# Patient Record
Sex: MALE | Race: WHITE | Employment: STUDENT | URBAN - METROPOLITAN AREA
[De-identification: names, ages, dates, MRNs, and addresses within clinical notes are randomized per-mention and may not be internally consistent; named-entity substitution may affect disease eponyms.]

---

## 2017-06-30 ENCOUNTER — OFFICE VISIT (OUTPATIENT)
Dept: INTERNAL MEDICINE CLINIC | Age: 27
End: 2017-06-30

## 2017-06-30 VITALS
RESPIRATION RATE: 16 BRPM | WEIGHT: 187.7 LBS | BODY MASS INDEX: 26.87 KG/M2 | SYSTOLIC BLOOD PRESSURE: 126 MMHG | OXYGEN SATURATION: 99 % | HEART RATE: 78 BPM | TEMPERATURE: 97.4 F | HEIGHT: 70 IN | DIASTOLIC BLOOD PRESSURE: 69 MMHG

## 2017-06-30 DIAGNOSIS — Z00.00 WELL ADULT EXAM: Primary | ICD-10-CM

## 2017-06-30 NOTE — MR AVS SNAPSHOT
Anesthesia Evaluation     .             ROS/MED HX    ENT/Pulmonary:  - neg pulmonary ROS    (-) sleep apnea   Neurologic:  - neg neurologic ROS     Cardiovascular:  - neg cardiovascular ROS       METS/Exercise Tolerance:     Hematologic:  - neg hematologic  ROS       Musculoskeletal:   (+) arthritis, , , other musculoskeletal- failed right hip replacement      GI/Hepatic:  - neg GI/hepatic ROS      (-) GERD   Renal/Genitourinary:  - ROS Renal section negative       Endo:  - neg endo ROS       Psychiatric:  - neg psychiatric ROS       Infectious Disease:  - neg infectious disease ROS       Malignancy:      - no malignancy   Other:                     Physical Exam  Normal systems: cardiovascular, pulmonary and dental    Airway   Mallampati: II  TM distance: >3 FB  Neck ROM: full    Dental     Cardiovascular       Pulmonary                     Anesthesia Plan      History & Physical Review  History and physical reviewed and following examination; no interval change.    ASA Status:  2 .    NPO Status:  > 8 hours    Plan for General and ETT with Intravenous induction. Maintenance will be Balanced.    PONV prophylaxis:  Ondansetron (or other 5HT-3) and Dexamethasone or Solumedrol       Postoperative Care  Postoperative pain management:  IV analgesics.      Consents  Anesthetic plan, risks, benefits and alternatives discussed with:  Patient.  Use of blood products discussed: Yes.   Use of blood products discussed with Patient.  Consented to blood products.  .          Procedure: Procedure(s):  ARTHROPLASTY REVISION HIP  Preop diagnosis: FAILED RIGHT TOTAL HIP     No Known Allergies  Past Medical History:   Diagnosis Date     Allergic rhinitis      Arthritis      Gout      Squamous cell cancer of external ear      Past Surgical History:   Procedure Laterality Date     ARTHROPLASTY HIP ANTERIOR Left 3/24/2016    Procedure: ARTHROPLASTY HIP ANTERIOR;  Surgeon: Selvin Witt MD;  Location: [facility] OR     ARTHROSCOPY KNEE   Visit Information Date & Time Provider Department Dept. Phone Encounter #  
 6/30/2017  9:00 AM Raman Gomez, [address] [facility] Internal Medicine [phone] [id number] Follow-up Instructions Return in about 1 year (around 6/30/2018). Upcoming Health Maintenance Date Due DTaP/Tdap/Td series (1 - Tdap) 10/10/2012 INFLUENZA AGE 9 TO ADULT 8/1/2017 Allergies as of 6/30/2017  Review Complete On: 6/30/2017 By: Raman Gomez, DO No Known Allergies Current Immunizations  Never Reviewed No immunizations on file. Not reviewed this visit You Were Diagnosed With   
  
 Codes Comments Well adult exam    -  Primary ICD-10-CM: Z00.00 ICD-9-CM: V70.0 Vitals BP Pulse Temp Resp Height(growth percentile) Weight(growth percentile) 126/69 (BP 1 Location: Left arm, BP Patient Position: Sitting) 78 97.4 °F (36.3 °C) 16 5' 10\" (1.778 m) 187 lb 11.2 oz (85.1 kg) SpO2 BMI Smoking Status 99% 26.93 kg/m2 Never Smoker BMI and BSA Data Body Mass Index Body Surface Area  
 26.93 kg/m 2 2.05 m 2 Preferred Pharmacy Pharmacy Name Phone [address], [facility] [address] [phone] Your Updated Medication List  
  
Notice  As of 6/30/2017  9:37 AM  
 You have not been prescribed any medications. We Performed the Following METABOLIC PANEL, BASIC [89667 CPT(R)] MICROALBUMIN, UR, RAND W/ MICROALBUMIN/CREA RATIO Q027972 CPT(R)] Follow-up Instructions Return in about 1 year (around 6/30/2018). Introducing [facility]! Anastacia James introduces [employer] patient portal. Now you can access parts of your medical record, email your doctor's office, and request medication refills online. 1. In your internet browser, go to [email or web address] 2. Click on the First Time User? Click Here link in the Sign In box.  You      C TOTAL HIP ARTHROPLASTY  06-    right hip 2013, left hip 2017     VASECTOMY  1979     Prior to Admission medications    Medication Sig Start Date End Date Taking? Authorizing Provider   Nutritional Supplements (NUTRITIONAL SUPPLEMENT PO) Take 1 tablet by mouth 2 times daily (with meals) (breakfast and dinner)   Yes Reported, Patient   Misc Natural Products (TART CHERRY ADVANCED PO) Take 1 capsule by mouth every morning   Yes Reported, Patient   VITAMIN D, CHOLECALCIFEROL, PO Take 1 tablet by mouth every morning   Yes Reported, Patient   MAGNESIUM OXIDE PO Take 250 mg by mouth every morning   Yes Reported, Patient   Ascorbic Acid (VITAMIN C PO) Take 500 mg by mouth every morning   Yes Reported, Patient   ALPHA LIPOIC ACID PO Take 1 tablet by mouth every morning OTC- unknown strength   Yes Reported, Patient   COENZYME Q-10 PO Take 100 mg by mouth every morning    Yes Reported, Patient   multivitamin, therapeutic with minerals (THERA-VIT-M) TABS Take 1 tablet by mouth every morning    Yes Reported, Patient   Probiotic Product (PROBIOTIC PO) Take 1 capsule by mouth every morning    Yes Reported, Patient     Current Facility-Administered Medications Ordered in Epic   Medication Dose Route Frequency Last Rate Last Dose     ceFAZolin sodium-dextrose (ANCEF) infusion 2 g  2 g Intravenous Pre-Op/Pre-procedure x 1 dose         ceFAZolin (ANCEF) 1 g vial to attach to  ml bag for ADULT or 50 ml bag for PEDS  1 g Intravenous See Admin Instructions         tranexamic acid (CYKLOKAPRON) 1 g in NaCl 0.9 % 60 mL bolus  1 g Intravenous Once         acetaminophen (TYLENOL) tablet 1,000 mg  1,000 mg Oral Once         tranexamic acid (CYKLOKAPRON) 1 g in NaCl 0.9 % 60 mL bolus  1 g Intravenous Once         lidocaine 1 % 1 mL  1 mL Other Q1H PRN         sodium chloride (PF) 0.9% PF flush 3 mL  3 mL Intracatheter Q1H PRN         lactated ringers infusion   Intravenous Continuous         No current Epic-ordered  will see the New Member Sign Up page. 3. Enter your [employer] Access Code exactly as it appears below. You will not need to use this code after youve completed the sign-up process. If you do not sign up before the expiration date, you must request a new code. · [employer] Access Code: [email or web address] Expires: 9/28/2017  8:58 AM 
 
4. Enter the last four digits of your Social Security Number (xxxx) and Date of Birth (mm/dd/yyyy) as indicated and click Submit. You will be taken to the next sign-up page. 5. Create a Auto [employer] ID. This will be your [employer] login ID and cannot be changed, so think of one that is secure and easy to remember. 6. Create a [employer] password. You can change your password at any time. 7. Enter your Password Reset Question and Answer. This can be used at a later time if you forget your password. 8. Enter your e-mail address. You will receive e-mail notification when new information is available in [address]. 9. Click Sign Up. You can now view and download portions of your medical record. 10. Click the Download Summary menu link to download a portable copy of your medical information. If you have questions, please visit the Frequently Asked Questions section of the [employer] website. Remember, [employer] is NOT to be used for urgent needs. For medical emergencies, dial 911. Now available from your iPhone and Android! Please provide this summary of care documentation to your next provider. Your primary care clinician is listed as [facility]. If you have any questions after today's visit, please call [phone]. outpatient prescriptions on file.     Wt Readings from Last 1 Encounters:   06/30/17 86.2 kg (190 lb)     Temp Readings from Last 1 Encounters:   06/30/17 36.7  C (98.1  F) (Temporal)     BP Readings from Last 6 Encounters:   06/30/17 144/71   06/01/17 154/74   03/25/16 (!) 170/99     Pulse Readings from Last 4 Encounters:   06/30/17 69   06/01/17 78   03/25/16 69     Resp Readings from Last 1 Encounters:   06/30/17 16     SpO2 Readings from Last 1 Encounters:   06/30/17 98%     Recent Labs   Lab Test  03/25/16   0648  03/24/16   0825   NA  140   --    POTASSIUM  3.7   --    CHLORIDE  107   --    CO2  28   --    ANIONGAP  5   --    GLC  108*  109*   --    BUN  13   --    CR  0.83  0.78   IVANNA  7.8*   --      Recent Labs   Lab Test  06/30/17   1218  03/25/16   0648   03/24/16   0825   WBC   --   10.2   --    --    HGB  12.6*  10.8*   < >  13.5   PLT   --   197   --   246    < > = values in this interval not displayed.     No results for input(s): INR in the last 26538 hours.    Invalid input(s): APTT   RECENT LABS:   ECG:   ECHO:   CXR:                    .

## 2017-07-29 NOTE — PROGRESS NOTES
HISTORY OF PRESENT ILLNESS  Nicole Dent is a 23 y.o. male. Here for his annual physical.  No major PMH. Former college football player. Has chronic aches and pains from previous injuries. H/o shoulder separation and knee injuries with ACL surgery. Has finished graduate school. Looking for a job. No smoking or drinking. Needs labs. No meds. Takes supplements. No other new c/o. Complete Physical   Pertinent negatives include no chest pain, no abdominal pain, no headaches and no shortness of breath. Review of Systems   Constitutional: Negative for fever, malaise/fatigue and weight loss. HENT: Negative for congestion. Eyes: Negative for blurred vision. Respiratory: Negative for cough and shortness of breath. Cardiovascular: Negative for chest pain and leg swelling. Gastrointestinal: Negative for abdominal pain and heartburn. Genitourinary: Negative for dysuria and frequency. Musculoskeletal: Positive for joint pain. Negative for back pain, falls, myalgias and neck pain. Skin: Negative for rash. Neurological: Negative for dizziness, sensory change and headaches. Psychiatric/Behavioral: Negative for depression. The patient is not nervous/anxious and does not have insomnia. All other systems reviewed and are negative. Physical Exam   Constitutional: He is oriented to person, place, and time. He appears well-developed and well-nourished. No distress. Pleasant young man   HENT:   Head: Normocephalic and atraumatic. Right Ear: External ear normal.   Left Ear: External ear normal.   Nose: Nose normal.   Mouth/Throat: Oropharynx is clear and moist. No oropharyngeal exudate. Eyes: Conjunctivae and EOM are normal. Pupils are equal, round, and reactive to light. No scleral icterus. Neck: Normal range of motion. Neck supple. No JVD present. No thyromegaly present. Cardiovascular: Normal rate, regular rhythm, normal heart sounds and intact distal pulses.     No murmur heard.  Pulmonary/Chest: Effort normal and breath sounds normal. No respiratory distress. He has no wheezes. He has no rales. He exhibits no tenderness. Abdominal: Soft. Bowel sounds are normal. He exhibits no distension. There is no tenderness. There is no rebound. Musculoskeletal: Normal range of motion. He exhibits tenderness (Knees, chronic). He exhibits no edema. Lymphadenopathy:     He has no cervical adenopathy. Neurological: He is alert and oriented to person, place, and time. No cranial nerve deficit. Coordination normal.   Skin: Skin is warm and dry. No rash noted. Psychiatric: He has a normal mood and affect. His behavior is normal.   Nursing note and vitals reviewed. ASSESSMENT and PLAN  Diagnoses and all orders for this visit:    1. Well adult exam  -     METABOLIC PANEL, BASIC  -     MICROALBUMIN, UR, RAND W/ MICROALBUMIN/CREA RATIO      Follow-up Disposition:  Return in about 1 year (around 6/30/2018).    lab results and schedule of future lab studies reviewed with patient  reviewed diet, exercise and weight control  reviewed medications and side effects in detail